# Patient Record
Sex: MALE | Race: ASIAN | NOT HISPANIC OR LATINO | Employment: STUDENT | URBAN - METROPOLITAN AREA
[De-identification: names, ages, dates, MRNs, and addresses within clinical notes are randomized per-mention and may not be internally consistent; named-entity substitution may affect disease eponyms.]

---

## 2022-06-14 ENCOUNTER — HOSPITAL ENCOUNTER (OUTPATIENT)
Dept: CT IMAGING | Facility: CLINIC | Age: 42
Discharge: HOME | End: 2022-06-14
Attending: PHYSICIAN ASSISTANT | Admitting: PHYSICIAN ASSISTANT
Payer: COMMERCIAL

## 2022-06-14 ENCOUNTER — OFFICE VISIT (OUTPATIENT)
Dept: FAMILY MEDICINE | Facility: CLINIC | Age: 42
End: 2022-06-14
Payer: COMMERCIAL

## 2022-06-14 VITALS
BODY MASS INDEX: 21.83 KG/M2 | WEIGHT: 123.2 LBS | OXYGEN SATURATION: 94 % | DIASTOLIC BLOOD PRESSURE: 64 MMHG | TEMPERATURE: 96.2 F | HEART RATE: 67 BPM | HEIGHT: 63 IN | RESPIRATION RATE: 16 BRPM | SYSTOLIC BLOOD PRESSURE: 105 MMHG

## 2022-06-14 DIAGNOSIS — Z00.00 ROUTINE GENERAL MEDICAL EXAMINATION AT A HEALTH CARE FACILITY: Primary | ICD-10-CM

## 2022-06-14 DIAGNOSIS — C34.91 CARCINOMA OF RIGHT LUNG (H): ICD-10-CM

## 2022-06-14 PROCEDURE — 250N000011 HC RX IP 250 OP 636: Performed by: PHYSICIAN ASSISTANT

## 2022-06-14 PROCEDURE — 99386 PREV VISIT NEW AGE 40-64: CPT | Performed by: PHYSICIAN ASSISTANT

## 2022-06-14 PROCEDURE — 250N000009 HC RX 250: Performed by: PHYSICIAN ASSISTANT

## 2022-06-14 PROCEDURE — 71260 CT THORAX DX C+: CPT | Mod: 26 | Performed by: RADIOLOGY

## 2022-06-14 PROCEDURE — 71260 CT THORAX DX C+: CPT

## 2022-06-14 RX ORDER — IOPAMIDOL 755 MG/ML
100 INJECTION, SOLUTION INTRAVASCULAR ONCE
Status: COMPLETED | OUTPATIENT
Start: 2022-06-14 | End: 2022-06-14

## 2022-06-14 RX ADMIN — IOPAMIDOL 61 ML: 755 INJECTION, SOLUTION INTRAVENOUS at 14:36

## 2022-06-14 RX ADMIN — SODIUM CHLORIDE 60 ML: 9 INJECTION, SOLUTION INTRAVENOUS at 14:38

## 2022-06-14 ASSESSMENT — ENCOUNTER SYMPTOMS
SORE THROAT: 0
FREQUENCY: 0
DIZZINESS: 0
HEARTBURN: 0
ARTHRALGIAS: 0
PALPITATIONS: 0
COUGH: 0
WEAKNESS: 0
MYALGIAS: 0
ABDOMINAL PAIN: 1
SHORTNESS OF BREATH: 0
PARESTHESIAS: 0
HEADACHES: 0
JOINT SWELLING: 0
CONSTIPATION: 0
CHILLS: 0
EYE PAIN: 0
DIARRHEA: 0
FEVER: 0
DYSURIA: 0
HEMATURIA: 0
NERVOUS/ANXIOUS: 0
NAUSEA: 0
HEMATOCHEZIA: 0

## 2022-06-14 NOTE — NURSING NOTE
"Chief Complaint   Patient presents with     Physical     /64   Pulse 67   Temp (!) 96.2  F (35.7  C) (Tympanic)   Resp 16   Ht 1.607 m (5' 3.25\")   Wt 55.9 kg (123 lb 3.2 oz)   SpO2 94%   BMI 21.65 kg/m   Estimated body mass index is 21.65 kg/m  as calculated from the following:    Height as of this encounter: 1.607 m (5' 3.25\").    Weight as of this encounter: 55.9 kg (123 lb 3.2 oz).  bp completed using cuff size: regular      Health Maintenance addressed:  NONE    n/a    Shweta Morton, RN, MA     "

## 2022-06-16 ENCOUNTER — DOCUMENTATION ONLY (OUTPATIENT)
Dept: ONCOLOGY | Facility: CLINIC | Age: 42
End: 2022-06-16
Payer: COMMERCIAL

## 2022-06-16 NOTE — PROGRESS NOTES
Action June 16, 2022 11:12 AM ABT   Action Taken Called patient with [employer]  Gabriela # [id number], relayed to patient that we need his lung cancer records from [place] for his diagnosis.    Images, not 100% that it was cancer but there was a mass    Email sent to patient [email or web address] and patient will respond back  with records from [facility]    2:18 PM  Records received from patient and sent to HIM for uplaoad

## 2022-06-17 ENCOUNTER — PATIENT OUTREACH (OUTPATIENT)
Dept: PULMONOLOGY | Facility: CLINIC | Age: 42
End: 2022-06-17
Payer: COMMERCIAL

## 2022-06-17 NOTE — PROGRESS NOTES
"Review of referral to [facility] by  PCP for this pt with self-reported history of lung cancer.    Pt saw [facility] PCP on 6/14; he reports hx of lung cancer. PCP ordered CT chest 6/14.    FINDINGS: The included thyroid appears unremarkable. Heart size  normal. Calibers of the thoracic aorta and main pulmonary artery is  normal. No pleural or pericardial effusion. No adrenal nodule or other  upper abdominal mass. Contrast mixing artifact in the spleen due to  scan timing relative to contrast administration. No adenopathy in the  chest. Bones show minimal degenerative disc changes in the upper  thoracic spine. Subsegmental atelectasis in the lingula. Calcified  right lower lobe granuloma.                                                                      IMPRESSION: Right lower lobe granulomatous disease. No other pulmonary  nodule.       I spoke to Mr Newton via [employer]  # [phone] \"Maho.\" We discussed his lung cancer history. Pt reports that in Jan 2022, he had a scan in [place] and his [place] provider indicated that he has \"carcinoma in situ,\" early stage lung malignancy for which Q6mon scans would be indicated for surveillance. Pt states that no tissue biopsies were ever taken and he has not received any intervention or treatment for his lungs.    Pt also states that he is flying back to [place] tomorrow evening Sat 6/18/22 and hopes he can meet w/ a lung specialist prior to leaving. Advised pt that no providers are available to see him prior to his departure. I provided him with our new intake phone# and encouraged him to call us to set up a new consult in [facility] upon his return to [place].     Pt inquired if his wife could attend a visit to see Pulm on his behalf while he is in [place]. I explained that we do not provide that service; pt must be present, at least within the borders of [place] to receive medical consult by our doctors who are only licensed in [place]. Pt verbalizes understanding of this " plan.

## 2022-10-03 ENCOUNTER — HEALTH MAINTENANCE LETTER (OUTPATIENT)
Age: 42
End: 2022-10-03

## 2023-08-13 ENCOUNTER — HEALTH MAINTENANCE LETTER (OUTPATIENT)
Age: 43
End: 2023-08-13

## 2023-11-02 NOTE — PROGRESS NOTES
SUBJECTIVE:   CC: Gabriela Newton is an 41 year old male who presents for preventative health visit.     Patient has been advised of split billing requirements and indicates understanding: Yes  Healthy Habits:     Getting at least 3 servings of Calcium per day:  NO    Bi-annual eye exam:  NO    Dental care twice a year:  NO    Sleep apnea or symptoms of sleep apnea:  None    Diet:  Other    Frequency of exercise:  None    Taking medications regularly:  Yes    Medication side effects:  None    PHQ-2 Total Score: 0    Additional concerns today:  No    Patient recently moved to [place].  History of EKG and CT scan in [place] (Jan 2022) showing lung concerns (carcinoma/GGO - no treatment needed but checking regularly) that need to be rechecked - chest pain was noted initially with onset of work up. No current symptoms, no shortness of breath, no recent chest pain or cough. No fever, chills, night sweats, no weight loss.  No personal smoking history; increased second hand smoke exposure; father passed away from lung cancer at 56 yo.    Patient flying back to [place] and then [place] on Friday (6/17/22); staying for a few months then returning to [facility].  He does have access to specialist while in [place].      Today's PHQ-2 Score:   PHQ-2 ( 1999 [employer]) 6/14/2022   Q1: Little interest or pleasure in doing things 0   Q2: Feeling down, depressed or hopeless 0   PHQ-2 Score 0   Q1: Little interest or pleasure in doing things Not at all   Q2: Feeling down, depressed or hopeless Not at all   PHQ-2 Score 0       Abuse: Current or Past(Physical, Sexual or Emotional)- No  Do you feel safe in your environment? Yes    Have you ever done Advance Care Planning? (For example, a Health Directive, POLST, or a discussion with a medical provider or your loved ones about your wishes): No, advance care planning information given to patient to review.  Patient declined advance care planning discussion at this time.    Social History     Tobacco Use     Smoking  status: Former Smoker     Smokeless tobacco: Never Used   Substance Use Topics     Alcohol use: Yes       Alcohol Use 6/14/2022   Prescreen: >3 drinks/day or >7 drinks/week? No       Last PSA: No results found for: PSA    Reviewed orders with patient. Reviewed health maintenance and updated orders accordingly - Yes  BP Readings from Last 3 Encounters:   06/14/22 105/64    Wt Readings from Last 3 Encounters:   06/14/22 55.9 kg (123 lb 3.2 oz)                  There is no problem list on file for this patient.    History reviewed. No pertinent surgical history.    Social History     Tobacco Use     Smoking status: Former Smoker     Smokeless tobacco: Never Used   Substance Use Topics     Alcohol use: Yes     History reviewed. No pertinent family history.      No current outpatient medications on file.     No Known Allergies  No lab results found.     Reviewed and updated as needed this visit by clinical staff   Tobacco  Allergies  Meds  Problems  Med Hx  Surg Hx  Fam Hx  Soc   Hx          Reviewed and updated as needed this visit by Provider   Tobacco  Allergies  Meds  Problems  Med Hx  Surg Hx  Fam Hx             History reviewed. No pertinent past medical history.   History reviewed. No pertinent surgical history.    Review of Systems   Constitutional: Negative for chills and fever.   HENT: Negative for congestion, ear pain, hearing loss and sore throat.    Eyes: Negative for pain and visual disturbance.   Respiratory: Negative for cough and shortness of breath.    Cardiovascular: Positive for chest pain. Negative for palpitations and peripheral edema.   Gastrointestinal: Positive for abdominal pain. Negative for constipation, diarrhea, heartburn, hematochezia and nausea.   Genitourinary: Negative for dysuria, frequency, genital sores, hematuria, impotence, penile discharge and urgency.   Musculoskeletal: Negative for arthralgias, joint swelling and myalgias.   Skin: Negative for rash.   Neurological:  "Negative for dizziness, weakness, headaches and paresthesias.   Psychiatric/Behavioral: Negative for mood changes. The patient is not nervous/anxious.      OBJECTIVE:   /64   Pulse 67   Temp (!) 96.2  F (35.7  C) (Tympanic)   Resp 16   Ht 1.607 m (5' 3.25\")   Wt 55.9 kg (123 lb 3.2 oz)   SpO2 94%   BMI 21.65 kg/m      Physical Exam  GENERAL: healthy, alert and no distress  EYES: Eyes grossly normal to inspection, PERRL and conjunctivae and sclerae normal  HENT: ear canals and TM's normal, nose and mouth without ulcers or lesions  NECK: no adenopathy, no asymmetry, masses, or scars and thyroid normal to palpation  RESP: lungs clear to auscultation - no rales, rhonchi or wheezes  CV: regular rate and rhythm, normal S1 S2, no S3 or S4, no murmur, click or rub, no peripheral edema and peripheral pulses strong  ABDOMEN: soft, nontender, no hepatosplenomegaly, no masses and bowel sounds normal  MS: no gross musculoskeletal defects noted, no edema  SKIN: no suspicious lesions or rashes  NEURO: Normal strength and tone, mentation intact and speech normal  PSYCH: mentation appears normal, affect normal/bright    Diagnostic Test Results:  Labs reviewed in Epic    ASSESSMENT/PLAN:       ICD-10-CM    1. Routine general medical examination at a health care facility  Z00.00    2. Carcinoma of right lung (H)  C34.91 Adult Oncology/Hematology Referral     CT Chest w/o & w Contrast       Patient has been advised of split billing requirements and indicates understanding: Yes    COUNSELING:   Reviewed preventive health counseling, as reflected in patient instructions       Regular exercise       Healthy diet/nutrition       Vision screening       Hearing screening    Estimated body mass index is 21.65 kg/m  as calculated from the following:    Height as of this encounter: 1.607 m (5' 3.25\").    Weight as of this encounter: 55.9 kg (123 lb 3.2 oz).     He reports that he has quit smoking. He has never used smokeless " tobacco.      Counseling Resources:  ATP IV Guidelines  Pooled Cohorts Equation Calculator  FRAX Risk Assessment  ICSI Preventive Guidelines  Dietary Guidelines for Americans, 2010  USDA's MyPlate  ASA Prophylaxis  Lung CA Screening    KRISTOPHER Chin [facility]   Improved

## 2024-10-06 ENCOUNTER — HEALTH MAINTENANCE LETTER (OUTPATIENT)
Age: 44
End: 2024-10-06